# Patient Record
(demographics unavailable — no encounter records)

---

## 2025-01-16 NOTE — HISTORY OF PRESENT ILLNESS
[de-identified] : 1/16/2025  Bradly Argueta presents to the office for follow-up of her left total knee arthroplasty.  Patient's left knee is currently doing well.  She is having lower back pain and radiculopathy.  She was seen in the [facility] clinic and was recommended for an injection.  No falls.  No fevers or chills.  8/6/2024  Bradly Argueta presents to the office for follow-up of her left total knee arthroplasty.  Patient is currently doing well overall.  She is not having much knee pain.  She continues to work in physical therapy.  No falls.  No fevers or chills.  5/2/2024  Bradly Argueta presented to the office for follow-up of her left total knee arthroplasty.  Patient is doing well overall.  She is not having much knee pain.  She can have some pain that radiates from the back into the foot.  She can have tingling in the foot.  No falls.  No fevers or chills.  3/21/2024  Bradly Argueta presents the office for follow-up of her left total knee arthroplasty.  Patient underwent left TKA on 1/22/2024.  She is doing well overall.  Pain has been improving.  She has some posterior knee stiffness.  Patient has completed her DVT prophylaxis.  She has some lower back pain.  No falls.  No fevers or chills. Patient reports right knee pain as well.  2/20/2024  Bradly Argueta presents to the office for follow-up of her left total knee arthroplasty.  Patient underwent left TKA on 1/22/2024.  She is doing well overall.  Patient's walking has been improving.  She has been in physical therapy.  She is able to straight leg raise.  Patient has been taking her aspirin for DVT prophylaxis.  No falls.  No fevers or chills.  2/6/2024  Bradly Argueta presents to the office for follow-up of her left total knee arthroplasty.  Patient underwent left TKA on 1/22/2024.  She is doing well overall.  Patient has been walking with her walker.  She has been in home physical therapy.  Patient has been taking aspirin for her DVT prophylaxis.  Patient was diagnosed with H. pylori and is currently under treatment.  No falls.  No fevers or chills.  1/16/2024  Hazel Argueta presents to the office for follow-up of her left knee pain.  Pain is located over the anterolateral knee.  Pain continues to affect her quality of life.  She has previously tried pain medications, physical therapy, and injections.  No falls.  No fevers or chills.  Pain is worse with stairs.  Patient had a colonoscopy and EGD last Wednesday.  11/16/2023 Bradly Argueta is a 54-year-old female who presented to the office for evaluation of her left knee pain.  Patient is experienced left knee pain for about 25 years.  She states that the pain is progressively worsening.  Pain is located over the medial and lateral knee.  It can radiate to the tibia.  Pain is worse with walking and patient states that she can only walk about 3-4 blocks.  She has tried Robaxin, gabapentin, and Tylenol.  She has tried Aleve, with some relief.  Patient has tried physical therapy, with some relief.  Her last physical therapy was in June 2023.  She has tried several corticosteroid injections, last in August 2023.  She has tried viscosupplementation injections in November 2022.  She states that the pain is now affecting her quality of life.  Patient has some lower back pain that can radiate down the leg.  She has tried physical therapy for the back pain.  No falls.  No fevers or chills.  Patient reports some GI issues with NSAIDs.  History: HTN, HLD, Pre-Diabetes

## 2025-01-16 NOTE — DISCUSSION/SUMMARY
[de-identified] : Bradly Argueta is a 55-year-old female who presents to the office for follow-up of her left total knee arthroplasty.  Patient underwent left TKA on 1/22/2024.  XRays showed left total knee arthroplasty in good position and alignment. Examination showed range of motion 0 to 110. Discussed with the patient the examination and imaging findings. Discussed the management of total knee arthroplasty at this time, including home exercises. Patient will continue her home exercises. Patient will participate in activities as tolerated. Patient will follow up with spine or in the clinic for her back pain. Patient will follow up in 1 year for reevaluation and management. Patient understanding and in agreement with the plan. All questions answered.     Plan: -Follow up with spine -Home Exercises -Activities as Tolerated -Follow up in 1 year for reevaluation and management

## 2025-01-16 NOTE — REVIEW OF SYSTEMS
[Joint Pain] : joint pain [Negative] : Endocrine [Joint Stiffness] : no joint stiffness [Joint Swelling] : no joint swelling [Fever] : no fever [Chills] : no chills [FreeTextEntry7] : Colic

## 2025-01-16 NOTE — PHYSICAL EXAM
[de-identified] : Constitutional:  55 year old female, alert and oriented, cooperative, in no acute distress.  HEENT  NC/AT.  Appearance: symmetric  Neck/Back Straight without deformity or instability.  Good ROM.  Chest/Respiratory  Respiratory effort: no intercostal retractions or use of accessory muscles. Nonlabored Breathing  Skin  On inspection, warm and dry without rashes or lesions.  Mental Status:  Judgment, insight: intact Orientation: oriented to time, place, and person  Neurological: Sensory and Motor are grossly intact throughout  Left Knee  Inspection:     Incision well healed. No erythema or drainage     No Effusion     Non-tender to palpation over tibial tubercle, patella, medial and lateral joint line, and pes insertion.   Range of Motion: 	Extension - 0 degrees 	Flexion - 110 degrees 	Extensor lag: None  Stability:      Demonstrates no Varus or Valgus instability      Negative Anterior or Posterior drawer.      No mid flexion instability  Patella: stable, tracks well.   Neurologic Exam     Motor intact including 5/5 Extensor Hallucis Longus, 5/5 Flexor Hallucis Longus, 5/5 Tibialis Anterior and 5/5 Gastrocnemius     Sensation Intact to Light Touch including Saphenous, Sural, Superficial Peroneal, Deep Peroneal, Tibial nerve distributions  Vascular Exam     Foot is warm and well perfused with 2+ Dorsalis Pedis Pulse   No pain with range of motion of the bilateral hips or right knee. No lumbar paraspinal muscle tenderness. There is tenderness over the left greater trochanter. [de-identified] : XRay:  XRays of the Left Knee (3 Views) taken today in the office. XRays demonstrate a Left Total Knee Arthroplasty in good position and alignment. There is no obvious evidence of fracture, dislocation, osteolysis or loosening. Patella in normal position, no patella nicky or baja. (my personal interpretation) Components: [employer]  XRay: XRays of the Right Knee (3 Views) taken on 3/21/2024. XRays demonstrate tricompartmental joint space narrowing, worse in the medial compartment, with subchondral sclerosis, overlying osteophytes, all consistent with severe osteoarthritis, KL Grade: 3. (my personal interpretation)

## 2025-01-29 NOTE — REVIEW OF SYSTEMS
[Fatigue] : fatigue [Insomnia] : insomnia [Fever] : no fever [Chills] : no chills [Night Sweats] : no night sweats [Vision Problems] : no vision problems [Earache] : no earache [Nasal Discharge] : no nasal discharge [Chest Pain] : no chest pain [Palpitations] : no palpitations [Leg Claudication] : no leg claudication [Lower Ext Edema] : no lower extremity edema [Orthopnea] : no orthopnea [Shortness Of Breath] : no shortness of breath [Wheezing] : no wheezing [Cough] : no cough [Abdominal Pain] : no abdominal pain [Nausea] : no nausea [Dysuria] : no dysuria [Nocturia] : no nocturia [Joint Pain] : no joint pain [Muscle Pain] : no muscle pain [Headache] : no headache [Confusion] : no confusion [Anxiety] : no anxiety

## 2025-01-29 NOTE — ASSESSMENT
[FreeTextEntry1] : Bradly Argueta is a 57yo female with a hx of hypothyroidism, osteoarthritis, and obesity who presents with insomnia.  Follow-up in September for annual CPE. Discussed with Dr. Hightower. __________ Sebastian Carrion MD PGY-2 [employer] [facility]

## 2025-01-29 NOTE — HISTORY OF PRESENT ILLNESS
[FreeTextEntry1] : Insomnia [de-identified] : Bradly Argueta is a 57yo female with a hx of hypothyroidism, osteoarthritis, and obesity who presents with insomnia.  Patient reports difficulty staying asleep. States she goes to bed around 10pm and naturally wakes up between 2-3am with difficulty falling back asleep. This has been very bothersome and tiresome for her. She does not snore nor wakes up gasping for air. She does drink 2-3 cup of coffee a day due to fatigue but stops drinking by 1pm. She does not drink soda or tea or exercise at nighttime. No family hx of sleep apnea.

## 2025-01-29 NOTE — HISTORY OF PRESENT ILLNESS
[FreeTextEntry1] : Insomnia [de-identified] : Bradly Argueta is a 57yo female with a hx of hypothyroidism, osteoarthritis, and obesity who presents with insomnia.  Patient reports difficulty staying asleep. States she goes to bed around 10pm and naturally wakes up between 2-3am with difficulty falling back asleep. This has been very bothersome and tiresome for her. She does not snore nor wakes up gasping for air. She does drink 2-3 cup of coffee a day due to fatigue but stops drinking by 1pm. She does not drink soda or tea or exercise at nighttime. No family hx of sleep apnea.

## 2025-01-29 NOTE — PHYSICAL EXAM
[No Acute Distress] : no acute distress [Normal Sclera/Conjunctiva] : normal sclera/conjunctiva [PERRL] : pupils equal round and reactive to light [EOMI] : extraocular movements intact [Supple] : supple [No Respiratory Distress] : no respiratory distress  [No Accessory Muscle Use] : no accessory muscle use [Clear to Auscultation] : lungs were clear to auscultation bilaterally [Normal Rate] : normal rate  [Regular Rhythm] : with a regular rhythm [Normal S1, S2] : normal S1 and S2 [No Murmur] : no murmur heard [No Edema] : there was no peripheral edema [Soft] : abdomen soft [Non Tender] : non-tender [Non-distended] : non-distended [No Focal Deficits] : no focal deficits [Normal Affect] : the affect was normal [Normal Insight/Judgement] : insight and judgment were intact

## 2025-06-12 NOTE — REVIEW OF SYSTEMS
[As Noted in HPI] : as noted in HPI [Negative] : Integumentary [Seizures] : no convulsions [Dizziness] : no dizziness [Fainting] : no fainting

## 2025-06-12 NOTE — END OF VISIT
[] : Resident [FreeTextEntry3] : [id number]-old woman with chronic LBP here for dysesthesia on her fingers intermittently. exam: strength is full and symmetrical. sensory to touch is intact. possible CTS and DJD cervical spine. recommend wrist splint. OT, continue gabapentin.  RTC 3 month, if not better, can do EMG [Time Spent: ___ minutes] : I have spent [unfilled] minutes of time on the encounter which excludes teaching and separately reported services.

## 2025-06-12 NOTE — ASSESSMENT
[FreeTextEntry1] : 56-year-old female with chronic LBP and neck pain came for neurological evaluation. There was no h/o bowel/bladder abnormality, no fever. no spinal tenderness or restricted movement on the exam. No neurological deficit was found. MRI cervical spine and lumbar spine showed degenerative changes.   Impression: Chronic low back pain and neck pain likely due to degenerative changes in the spine. tingling and numbness in the hands could be from carpal tunnel syndrome, will need NCS/EMG if no improvement with PT/OT.   Plan: - Physical therapy and occupational for LBP and cervicalgia - C/W Gabapentin 300mg twice daily - Acetaminophen 650mg Q6H PRN for pain - Use carpal tunnel wrist splint at nighttime in both hands - F/U in 3 months   Discussed with neurology attending Dr. Westfall.

## 2025-06-12 NOTE — HISTORY OF PRESENT ILLNESS
[FreeTextEntry1] : This 56-year-old female with PMHx of hypothyroidism, osteoarthritis and obesity was referred to neurology for chronic neck pain with radiation to both arms. As per the patient she has been suffering from low back pain and neck pain for last 3-4 years. The neck pain is usually worse in the morning, or after excessive work, the pain is sometimes sharp in nature, with radiation to both UE with tingling and numbness in the fingers. The pain is 6/10 when its severe. Sometimes she feels weakn while making a  or holding something after prolonged work. The low back pain is usually worse at the end of the day after prolonged seating or standing. 7-8/10 in severity, with radiation to the left LE.  There was no h/o bowel or bladder abnormality, no low-grade fever or spine tenderness. Patient denied any trauma to the spine.    MRI cervical spine on 06/01/2025 showed degenerative changes.   MRI lumbar spine in September 2024 showed degenerative changes.

## 2025-06-12 NOTE — PHYSICAL EXAM
[General Appearance - Alert] : alert [General Appearance - In No Acute Distress] : in no acute distress [General Appearance - Well Nourished] : well nourished [General Appearance - Well Developed] : well developed [Oriented To Time, Place, And Person] : oriented to person, place, and time [Affect] : the affect was normal [Person] : oriented to person [Place] : oriented to place [Time] : oriented to time [I: Normal Smell] : smell intact bilaterally [Cranial Nerves Optic (II)] : visual acuity intact bilaterally,  visual fields full to confrontation, pupils equal round and reactive to light [Cranial Nerves Oculomotor (III)] : extraocular motion intact [Cranial Nerves Trigeminal (V)] : facial sensation intact symmetrically [Cranial Nerves Facial (VII)] : face symmetrical [Cranial Nerves Vestibulocochlear (VIII)] : hearing was intact bilaterally [Cranial Nerves Glossopharyngeal (IX)] : tongue and palate midline [Cranial Nerves Accessory (XI - Cranial And Spinal)] : head turning and shoulder shrug symmetric [Cranial Nerves Hypoglossal (XII)] : there was no tongue deviation with protrusion [Motor Tone] : muscle tone was normal in all four extremities [Involuntary Movements] : no involuntary movements were seen [No Muscle Atrophy] : normal bulk in all four extremities [Sensation Tactile Decrease] : light touch was intact [Sensation Pain / Temperature Decrease] : pain and temperature was intact [Balance] : balance was intact [2+] : Ankle jerk left 2+ [Sclera] : the sclera and conjunctiva were normal [PERRL With Normal Accommodation] : pupils were equal in size, round, reactive to light, with normal accommodation [Full Visual Field] : full visual field [Hearing Threshold Finger Rub Not [place]] : hearing was normal [Neck Appearance] : the appearance of the neck was normal [Neck Cervical Mass (___cm)] : no neck mass was observed [] : no respiratory distress [Musculoskeletal - Swelling] : no joint swelling seen [Abnormal Walk] : normal gait [FreeTextEntry6] : Strength is 5/5 in all 4 limbs except very mild weakness 4+/5 in hand  bilaterally.